# Patient Record
(demographics unavailable — no encounter records)

---

## 2025-07-02 NOTE — PHYSICAL EXAM
[Normal Rate and Rhythm] : normal rate and rhythm [2+] : left 2+ [Alert] : alert [Oriented to Person] : oriented to person [Oriented to Place] : oriented to place [Oriented to Time] : oriented to time [Calm] : calm [Skin Ulcer] : no ulcer [de-identified] : Appears well, in no acute distress.  [de-identified] : normocephalic, atraumatic  [de-identified] :   normal respiratory effort. unlabored breathing.  [de-identified] : LLE C3 venous disease

## 2025-07-02 NOTE — HISTORY OF PRESENT ILLNESS
[FreeTextEntry1] : 45-year-old female with PMH Heterozygous for Factor V Leiden, LLE DVT s/p LE liposuction many years ago was on Coumadin at the time presents with bilateral lower extremity aching, heaviness and swelling worse on the left. She reports discomfort left shin varicosities. She wears compression stocking daily with some improvement.

## 2025-07-02 NOTE — PHYSICAL EXAM
[Normal Rate and Rhythm] : normal rate and rhythm [2+] : left 2+ [Alert] : alert [Oriented to Person] : oriented to person [Oriented to Place] : oriented to place [Oriented to Time] : oriented to time [Calm] : calm [Skin Ulcer] : no ulcer [de-identified] : Appears well, in no acute distress.  [de-identified] : normocephalic, atraumatic  [de-identified] :   normal respiratory effort. unlabored breathing.  [de-identified] : LLE C3 venous disease

## 2025-07-02 NOTE — ASSESSMENT
[Foot care/Footwear] : foot care/footwear [FreeTextEntry1] : 45-year-old female with PMH Heterozygous for Factor V Leiden, LLE DVT, s/p LE liposuction many years ago was on Coumadin with symptomatic C3 venous disease. Venous duplex demonstrates R deep popliteal vein venous insufficiency, R  lateral calf with varicose branch, no evidence of DVT or SVT bilaterally and no venous insufficiency LLE.  Plan Duplex findings reviewed with patient. Discussed with patient that we could treat symptomatic LLE superficial varicosity with ultrasound guided sclerotherapy and stab phlebectomy. However, she does have deep popliteal reflux which may be contributing to her symptoms. Discussed that there is an increased risk of DVT, as she is heterozygous for Factor V Leiden and previous history of DVT. If she decides to proceed with procedure, she will require prophylactic anticoagulation. At this time patient agrees to continue with conservative therapy: compression stockings 20-30 mmHg daily from awakening until bedtime, leg elevation above the level of the heart at rest, frequent ambulation and walk daily for exercise. Script provided. Patient will call the office if she decides to proceed with procedure.